# Patient Record
Sex: MALE | Race: OTHER | NOT HISPANIC OR LATINO | URBAN - METROPOLITAN AREA
[De-identification: names, ages, dates, MRNs, and addresses within clinical notes are randomized per-mention and may not be internally consistent; named-entity substitution may affect disease eponyms.]

---
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
## 2025-02-27 NOTE — ED ADULT TRIAGE NOTE - NSWEIGHTCALCTOOLDRUG_GEN_A_CORE
**Procedure discussed in detail including indications, risks, benefits with pt. Discussed w pt reaching out to emergency contact/family for consent and to provide them an update however pt refusing. Discussed need for surrogate decision maker should he not be able to make his own decisions however pt continued to refuse.     Pt agreeable to procedure and able to return verbalize risks, benefits and goals of procedure. RN witnessed consent process.     Asher JO  2/27/2025  
 used